# Patient Record
Sex: FEMALE | Race: WHITE | URBAN - METROPOLITAN AREA
[De-identification: names, ages, dates, MRNs, and addresses within clinical notes are randomized per-mention and may not be internally consistent; named-entity substitution may affect disease eponyms.]

---

## 2019-07-26 ENCOUNTER — HOSPITAL ENCOUNTER (OUTPATIENT)
Dept: PHYSICAL THERAPY | Age: 44
Discharge: HOME | End: 2019-07-26
Payer: COMMERCIAL

## 2019-07-26 PROCEDURE — 97162 PT EVAL MOD COMPLEX 30 MIN: CPT

## 2019-07-26 PROCEDURE — 97140 MANUAL THERAPY 1/> REGIONS: CPT

## 2019-07-26 PROCEDURE — 97110 THERAPEUTIC EXERCISES: CPT

## 2019-07-26 NOTE — PROGRESS NOTES
PELVIC FLOOR DAILY TREATMENT NOTE 8-14    Patient Name: Baron Parrish  Date:2019  : 1975  [x]  Patient  Verified  Payor: /    In time:2:30  Out time:4:00  Total Treatment Time (min): 90  Total Timed Codes (min): na  1:1 Treatment Time (min): na   Visit #: 1 of 8    Treatment Area: Pelvic floor dysfunction [M62.89]    SUBJECTIVE  Pain Level (0-10 scale): 5  Any medication changes, allergies to medications, adverse drug reactions, diagnosis change, or new procedure performed?: [x] No    [] Yes (see summary sheet for update)  Subjective functional status/changes:   [] No changes reported    See medical history    OBJECTIVE  Modality rationale: decrease inflammation, decrease pain and increase tissue extensibility to improve the patients ability to perform ADLs   Min Type Additional Details   na [] Biofeedback x na minutes    na    [] Estim: []Att   []Unatt        []TENS instruct                  []IFC  []Premod   []NMES                     []Other:  []w/US   []w/ice   []w/heat  Position:  Location:    []  Traction: [] Cervical       []Lumbar                       [] Prone          []Supine                       []Intermittent   []Continuous Lbs:  [] before manual  [] after manual    []  Ultrasound: []Continuous   [] Pulsed                           []1MHz   []3MHz Location:  W/cm2:    []  Iontophoresis with dexamethasone         Location: [] Take home patch   [] In clinic   10 [x]  Ice     []  heat  []  Ice massage Position: supine  Location: L/S and L pelvic    []  Vasopneumatic Device Pressure:       [] lo [] med [] hi   Temperature: [] lo [] med [] hi   [x] Skin assessment post-treatment:  []intact []redness- no adverse reaction       []redness  adverse reaction:       10 min Manual Therapy: MET L anterior thompson rotation with education on the SI joint. Rationale: Increase ROM to decrease dyspareunia.            15 min Patient Education: [x] Review HEP    [] Progressed/Changed HEP based on: Educated Pt in pelvic floor anatomy, function/dysfunction. Reviewed biofeedback results. Ice 10-15 minutes to L SI joint daily. [] positioning   [] body mechanics   [] transfers   [] heat/ice application          Pain Level (0-10 scale) post treatment: 0    ASSESSMENT/Changes in Function: Justification for Eval Code Complexity:  Patient History : See POC  Examination see exam   Clinical Presentation: evolving  Clinical Decision Making : FOTO : 25 /100      Patient will continue to benefit from skilled PT services to modify and progress therapeutic interventions, address strength deficits, analyze and address soft tissue restrictions, analyze and modify body mechanics/ergonomics, assess and modify postural abnormalities, instruct in home and community integration and address UI and dyspareunia. to attain remaining goals. [x]  See Plan of Care  []  See progress note/recertification  []  See Discharge Summary         Progress towards goals / Updated goals:  Initial evaluation completed with manual therapy and education initiated.       PLAN  [x]  Upgrade activities as tolerated     []  Continue plan of care  []  Update interventions per flow sheet       []  Discharge due to:_  []  Other:_      Andrew Beebe, PT 7/26/2019  2:29 PM      Future Appointments   Date Time Provider April Corbett   8/15/2019  5:00 PM Michelle Muir, PT [facility]

## 2019-07-26 NOTE — PROGRESS NOTES
[address] PHYSICAL THERAPY AT [facility] [address], [address]  [address] - Phone: [phone]  Fax: [phone] / [address] [address] PHYSICAL THERAPY SERVICES  Patient Name: Preethi Subramanian : 1975   Medical   Diagnosis: Pelvic floor dysfunction [M62.89] Treatment Diagnosis: Pelvic floor dysfunction [M62.89]   Onset Date: 2019     Referral Source: Simona Sanchez MD [facility]): 2019   Prior Hospitalization: See medical history Provider #: [id number]   Prior Level of Function: Chronic  LBP since . Comorbidities: G2, P2, Adnomyosis of uterus   Medications: Verified on Patient Summary List   The Plan of Care and following information is based on the information from the initial evaluation.   ==================================================================================  Assessment / key information:  Patient is a 53 y.o. yo female who presents to [facility] with diagnosis of Pelvic floor dysfunction [M62.89]. Patient reports L sided LBP which radiates down LLE to the heel, L pelvic pain, dyspareunia, JEAN with sneezing and coughing and urgency . Patient is G2, P2 with vaginal deliveries. LBP began insidiously in  but she was working as a  at that time lifting and carrying heavy trays. Dyspareunia began 6 months ago. Upon objective evaluation, patient demonstrates L SI hypomobility with anterior ileal rotation. There was tenderness to palpation over L pubococcygeus and obturator intenus muscles, R illiopsoas muscle. .  Strength of pelvic floor muscles was impaired at 1/5. On biofeedback patient presented with normal resting tone of pelvic floor at  1.54 microvolts. There was low net rise of fast twitch contraction at 2.69 microvolts and low net rise of slow twitch contraction at 2.5 microvolts. Patient scored 25 on FOTO/PFDI pain indicating decreased quality of life.    Patient can benefit from PT for retraining of muscle control and relaxation on biofeedback, manual therapy and core strengthening to decrease pain, Increase SI mobility, core/pelvic floor muscle strength  to improve quality of life.  ==================================================================================  Eval Complexity: History: HIGH Complexity :3+ comorbidities / personal factors will impact the outcome/ POC Exam:HIGH Complexity : 4+ Standardized tests and measures addressing body structure, function, activity limitation and / or participation in recreation  Presentation: MEDIUM Complexity : Evolving with changing characteristics  Clinical Decision Making:HIGH Complexity : FOTO score of 1- 25 Overall Complexity:MEDIUM  Problem List: Pelvic pain/dysfunction, Decreased pelvic floor mm awareness, Decreased pelvic floor mm strength, Urinary urgency and Other   Treatment Plan may include any combination of the following: Therapeutic exercise, Urge suppression techniques, Neuromuscular re-education, Manual therapy, Physical agent/modality and Patient education  Patient / Family readiness to learn indicated by: asking questions, trying to perform skills and interest  Persons(s) to be included in education: patient (P)  Barriers to Learning/Limitations: None  Measures taken:    Patient Goal (s): \"Not have a hysterectomy\"   Patient self reported health status: good  Rehabilitation Potential: good  Short Term Goals: To be accomplished in 4 weeks:     1) Patient performing pelvic floor exercises TID. 2) Patient will report 25% improvement in pain with ADLs. 3) Increase net rise of slow twitch contraction to 5 microvolts to increase pelvic support during ADLs. 4) Patient able to maintain SI mobility or self correct. Long Term Goals: To be accomplished in 8 weeks:   1) Patient independent in HEP. 2) Patient will report 25% improvement in pain with pain with sexual intercourse.      3) Decrease score on FOTO/PFDI pain to 20 to indicate improved quality of life. 4) Increase net rise of slow twitch contraction to 7 microvolts  to improve ability to perform household chores. Frequency / Duration:   Patient to be seen  1  times per week for 8  weeks:  Patient / Caregiver education and instruction: Pain Management and Exercises  G-Codes (GP): dipesh  Therapist Signature: Andrew Beebe PT Date: 4/10/4727   Certification Period: na Time: 4:05 PM   ==================================================================================  I certify that the above Physical Therapy Services are being furnished while the patient is under my care. I agree with the treatment plan and certify that this therapy is necessary. Physician Signature:        Date:       Time:     Please sign and [facility] to [facility] or you may fax the signed copy to [phone]. Thank you.

## 2019-08-15 ENCOUNTER — HOSPITAL ENCOUNTER (OUTPATIENT)
Dept: PHYSICAL THERAPY | Age: 44
Discharge: HOME | End: 2019-08-15
Payer: COMMERCIAL

## 2019-08-15 PROCEDURE — 97112 NEUROMUSCULAR REEDUCATION: CPT

## 2019-08-15 PROCEDURE — 97110 THERAPEUTIC EXERCISES: CPT

## 2019-08-15 PROCEDURE — 97140 MANUAL THERAPY 1/> REGIONS: CPT

## 2019-08-15 NOTE — PROGRESS NOTES
PELVIC FLOOR DAILY TREATMENT NOTE     Patient Name: Dena Goldstein  Date:8/15/2019  : 1975  [x]  Patient  Verified  Payor: Carissa Jacob / Plan: Amy Fong HMO / Product Type: HMO /    In time:4:50  Out time: 6:00  Total Treatment Time (min): 65  Total Timed Codes (min): na  1:1 Treatment Time (min): na   Visit #: 2 of 8    Treatment Area: Pelvic floor dysfunction [M62.89]    SUBJECTIVE  Pain Level (0-10 scale): 4  Any medication changes, allergies to medications, adverse drug reactions, diagnosis change, or new procedure performed?: [x] No    [] Yes (see summary sheet for update)  Subjective functional status/changes:   [] No changes reported    Patient reports doing ice most days. Less frequency of pain last week. OBJECTIVE  Modality rationale: decrease inflammation, decrease pain and increase tissue extensibility to improve the patients ability to perform ADLs. Neuromuscular reeducation to improve patients JEAN.    Min Type Additional Details   25 [x] Biofeedback x 25 minutes    supine surface    [] Estim: []Att   []Unatt        []TENS instruct                  []IFC  []Premod   []NMES                     []Other:  []w/US   []w/ice   []w/heat  Position:  Location:    []  Traction: [] Cervical       []Lumbar                       [] Prone          []Supine                       []Intermittent   []Continuous Lbs:  [] before manual  [] after manual    []  Ultrasound: []Continuous   [] Pulsed                           []1MHz   []3MHz Location:  W/cm2:    []  Iontophoresis with dexamethasone         Location: [] Take home patch   [] In clinic   10 [x]  Ice     []  heat  []  Ice massage Position: supine  Location: L/S and bilateral pelvic    []  Vasopneumatic Device Pressure:       [] lo [] med [] hi   Temperature: [] lo [] med [] hi   [x] Skin assessment post-treatment:  []intact []redness- no adverse reaction       []redness  adverse reaction:     15 min Therapeutic Exercise:  [x] See flow sheet :  []  Pelvic floor strengthening                []  Pelvic floor downtraining  []  Quality pelvic floor contractions      []  Relaxation techniques  []  Urge suppression exercises  [x]  Other:  Psoas inhibition exercises    Rationale: Decrease muscle activity to improve the patients ability to perform ADLs      15 min Manual Therapy: Release to bilateral illiopsoas near illiac crests and bilateral obturator internus muscles vaginally with vaginal stretching. Rationale: Increase ROM to decrease dyspareunia.            min Patient Education: [x] Review HEP    [] Progressed/Changed HEP based on: Begin psoas inhibition exercises. Continue ice. [] positioning   [] body mechanics   [] transfers   [] heat/ice application        Other Objective/Functional Measures:  - standing flexion and gillet                   []baseline resting tone: na              [x]slow twitch mms 8.64(4.63)              [x]fast twitch mms8.46(3.19)  Pain Level (0-10 scale) post treatment: 0    ASSESSMENT/Changes in Function: Patient maintaining SI mobility. Continued illiopsoas tenderness. Started on psoas inhibition exercises. Patient will continue to benefit from skilled PT services to modify and progress therapeutic interventions, address strength deficits, analyze and address soft tissue restrictions, analyze and modify body mechanics/ergonomics, assess and modify postural abnormalities, instruct in home and community integration and address UI and dyspareunia. to attain remaining goals. []  See Plan of Care  []  See progress note/recertification  []  See Discharge Summary         Progress towards goals / Updated goals:                 1) Patient performing pelvic floor exercises 3x day                   2) Patient will report 25% improvement in pain with ADLs. 3) Increase net rise of slow twitch contraction to 5 microvolts to increase pelvic support during ADLs.                  4) Patient able to maintain SI mobility or self correct. Met  PLAN  [x]  Upgrade activities as tolerated     []  Continue plan of care  []  Update interventions per flow sheet       []  Discharge due to:_  []  Other:_      Abhilash Del Valle, PT 8/15/2019  6:00 PM      No future appointments.

## 2019-08-15 NOTE — PROGRESS NOTES
[address] PHYSICAL THERAPY AT [facility] [address], [address] - Phone: [phone]  Fax: [phone] / [address] [address] PHYSICAL THERAPY SERVICES  Patient Name: Margarita Mcguire : 1975   Medical   Diagnosis: Pelvic floor dysfunction [M62.89] Treatment Diagnosis: Pelvic floor dysfunction [M62.89]   Onset Date: 2019     Referral Source: Nhan Jones MD [facility]): 8/15/2019   Prior Hospitalization: See medical history Provider #: [id number]   Prior Level of Function: Chronic  LBP since . Comorbidities: G2, P2, Adnomyosis of uterus   Medications: Verified on Patient Summary List   The Plan of Care and following information is based on the information from the initial evaluation.   ==================================================================================  Assessment / key information:  Patient is a 53 y.o. yo female who presents to [facility] with diagnosis of Pelvic floor dysfunction [M62.89]. Patient reports L sided LBP which radiates down LLE to the heel, L pelvic pain, dyspareunia, JEAN with sneezing and coughing and urgency . Patient is G2, P2 with vaginal deliveries. LBP began insidiously in  but she was working as a  at that time lifting and carrying heavy trays. Dyspareunia began 6 months ago. Upon objective evaluation, patient demonstrates L SI hypomobility with anterior ileal rotation. There was tenderness to palpation over L pubococcygeus and obturator intenus muscles, R illiopsoas muscle. .  Strength of pelvic floor muscles was impaired at 1/5. On biofeedback patient presented with normal resting tone of pelvic floor at  1.54 microvolts. There was low net rise of fast twitch contraction at 2.69 microvolts and low net rise of slow twitch contraction at 2.5 microvolts. Patient scored 25 on FOTO/PFDI pain indicating decreased quality of life.    Patient can benefit from PT for retraining of muscle control and relaxation on biofeedback, manual therapy and core strengthening to decrease pain, Increase SI mobility, core/pelvic floor muscle strength  to improve quality of life.  ==================================================================================  Eval Complexity: History: HIGH Complexity :3+ comorbidities / personal factors will impact the outcome/ POC Exam:HIGH Complexity : 4+ Standardized tests and measures addressing body structure, function, activity limitation and / or participation in recreation  Presentation: MEDIUM Complexity : Evolving with changing characteristics  Clinical Decision Making:HIGH Complexity : FOTO score of 1- 25 Overall Complexity:MEDIUM  Problem List: Pelvic pain/dysfunction, Decreased pelvic floor mm awareness, Decreased pelvic floor mm strength, Urinary urgency and Other   Treatment Plan may include any combination of the following: Therapeutic exercise, Urge suppression techniques, Neuromuscular re-education, Manual therapy, Physical agent/modality and Patient education  Patient / Family readiness to learn indicated by: asking questions, trying to perform skills and interest  Persons(s) to be included in education: patient (P)  Barriers to Learning/Limitations: None  Measures taken:    Patient Goal (s): \"Not have a hysterectomy\"   Patient self reported health status: good  Rehabilitation Potential: good  Short Term Goals: To be accomplished in 4 weeks:     1) Patient performing pelvic floor exercises TID. 2) Patient will report 25% improvement in pain with ADLs. 3) Increase net rise of slow twitch contraction to 5 microvolts to increase pelvic support during ADLs. 4) Patient able to maintain SI mobility or self correct. Long Term Goals: To be accomplished in 8 weeks:   1) Patient independent in HEP. 2) Patient will report 25% improvement in pain with pain with sexual intercourse.      3) Decrease score on FOTO/PFDI pain to 20 to indicate improved quality of life. 4) Increase net rise of slow twitch contraction to 7 microvolts  to improve ability to perform household chores. Frequency / Duration:   Patient to be seen  1  times per week for 8  weeks:  Patient / Caregiver education and instruction: Pain Management and Exercises  G-Codes (GP): dipesh  Therapist Signature: Shiva Schreiber PT Date: 5/29/0782   Certification Period: na Time: 4:05 PM   ==================================================================================  I certify that the above Physical Therapy Services are being furnished while the patient is under my care. I agree with the treatment plan and certify that this therapy is necessary. Physician Signature:        Date:       Time:     [facility] or you may fax the signed copy to [phone]. Thank you.

## 2019-08-22 ENCOUNTER — HOSPITAL ENCOUNTER (OUTPATIENT)
Dept: PHYSICAL THERAPY | Age: 44
Discharge: HOME | End: 2019-08-22
Payer: COMMERCIAL

## 2019-08-22 PROCEDURE — 97110 THERAPEUTIC EXERCISES: CPT

## 2019-08-22 PROCEDURE — 97140 MANUAL THERAPY 1/> REGIONS: CPT

## 2019-08-22 PROCEDURE — 97112 NEUROMUSCULAR REEDUCATION: CPT

## 2019-08-22 NOTE — PROGRESS NOTES
PELVIC FLOOR DAILY TREATMENT NOTE 8-    Patient Name: Pb Gallegos  Date:2019  : 1975  [x]  Patient  Verified  Payor: Ryann Garibay / Plan: Chip Acosta HMO / Product Type: HMO /    In time: 11:42 Out time: 12:32  Total Treatment Time (min): 50  Total Timed Codes (min): na  1:1 Treatment Time (min): na   Visit #: 3 of 8    Treatment Area: Pelvic floor dysfunction [M62.89]    SUBJECTIVE  Pain Level (0-10 scale): 5  Any medication changes, allergies to medications, adverse drug reactions, diagnosis change, or new procedure performed?: [x] No    [] Yes (see summary sheet for update)  Subjective functional status/changes:   [] No changes reported    Patient reports doing HEP 1x day for 5 days. Was on vacation and drove 5 hours there and back. OBJECTIVE  Modality rationale: decrease inflammation, decrease pain and increase tissue extensibility to improve the patients ability to perform ADLs. Neuromuscular reeducation to improve patients JEAN.    Min Type Additional Details   17 [x] Biofeedback x 17 minutes    supine surface    [] Estim: []Att   []Unatt        []TENS instruct                  []IFC  []Premod   []NMES                     []Other:  []w/US   []w/ice   []w/heat  Position:  Location:    []  Traction: [] Cervical       []Lumbar                       [] Prone          []Supine                       []Intermittent   []Continuous Lbs:  [] before manual  [] after manual    []  Ultrasound: []Continuous   [] Pulsed                           []1MHz   []3MHz Location:  W/cm2:    []  Iontophoresis with dexamethasone         Location: [] Take home patch   [] In clinic   PD TC [x]  Ice     []  heat  []  Ice massage Position: supine  Location: L/S and bilateral pelvic    []  Vasopneumatic Device Pressure:       [] lo [] med [] hi   Temperature: [] lo [] med [] hi   [x] Skin assessment post-treatment:  []intact []redness- no adverse reaction       []redness  adverse reaction:     10 min Therapeutic Exercise:  [x] See flow sheet :  []  Pelvic floor strengthening                []  Pelvic floor downtraining  []  Quality pelvic floor contractions      []  Relaxation techniques  []  Urge suppression exercises  [x]  Other:  Psoas inhibition exercises    Rationale: Decrease muscle activity to improve the patients ability to perform ADLs      23 min Manual Therapy: MET L anterior ileal rotation with instruction in self correction. Release to bilateral illiopsoas near illiac crests. Education in correct execution of pelvic floor contraction with manual palpation. Rationale: Increase ROM to decrease dyspareunia.            min Patient Education: [x] Review HEP    [] Progressed/Changed HEP based on: Add fast twitch pelvic floor contractions. SI correction as needed. [] positioning   [] body mechanics   [] transfers   [] heat/ice application        Other Objective/Functional Measures: + standing flexion on left.                  []baseline resting tone: na              [x]slow twitch mms 5.97(3.47)              [x]fast twitch mms 6.54(3.15)  Pain Level (0-10 scale) post treatment: 0    ASSESSMENT/Changes in Function: Continued SI hypomobility. Patient instructed in self mobilization. Patient will continue to benefit from skilled PT services to modify and progress therapeutic interventions, address strength deficits, analyze and address soft tissue restrictions, analyze and modify body mechanics/ergonomics, assess and modify postural abnormalities, instruct in home and community integration and address UI and dyspareunia. to attain remaining goals. []  See Plan of Care  []  See progress note/recertification  []  See Discharge Summary         Progress towards goals / Updated goals:                 1) Patient performing pelvic floor exercises 3x day                   2) Patient will report 25% improvement in pain with ADLs.                    3) Increase net rise of slow twitch contraction to 5 microvolts to increase pelvic support during ADLs. 4) Patient able to maintain SI mobility or self correct.   Met  PLAN  [x]  Upgrade activities as tolerated     []  Continue plan of care  []  Update interventions per flow sheet       []  Discharge due to:_  []  Other:_      Lalit Sanders, PT 8/22/2019  12:32 PM      Future Appointments   Date Time Provider April Corbett   9/11/2019  7:30 AM Merlin Espinosa, PT [facility]   9/27/2019 12:45 PM Merlin Espinosa PT [facility]   10/4/2019 12:45 PM Merlin Espinosa PT [facility]   10/11/2019 12:45 PM Merlin Espinosa PT [facility]   10/18/2019 12:45 PM Merlin Espinosa, PT [facility]

## 2019-09-11 ENCOUNTER — HOSPITAL ENCOUNTER (OUTPATIENT)
Dept: PHYSICAL THERAPY | Age: 44
Discharge: HOME | End: 2019-09-11
Payer: COMMERCIAL

## 2019-09-11 PROCEDURE — 97112 NEUROMUSCULAR REEDUCATION: CPT

## 2019-09-11 PROCEDURE — 97140 MANUAL THERAPY 1/> REGIONS: CPT

## 2019-09-11 NOTE — PROGRESS NOTES
[facility] PHYSICAL THERAPY AT [address], [address], [place] [address] - Phone: [phone]  Fax: [phone]  PROGRESS NOTE  Patient Name: Tiffany Martin : 1975   Treatment/Medical Diagnosis: Pelvic floor dysfunction [M62.89]   Referral Source: Jonathan Pineda MD     Date of Initial Visit: 2019 Attended Visits: 4 Missed Visits: 0   SUMMARY OF TREATMENT  PT has consisted of pelvic floor relaxation/strengthening via biofeedback, education as to pelvic floor anatomy and function , manual therapy, ice and home exercise program.   CURRENT STATUS  Patient has made slow progress in PT with short term goals either met or progressing. Functional progress Includes patient reporting 60% improvement in L sided LBP and improved JEAN. Patient demonstrates improved but still impaired pelvic floor muscle strength and right sided LBP. Goal/Measure of Progress Goal Met? 1. Patient performing pelvic floor exercises 3x day   Status at last Eval: na Current Status: 2-3x day progressing   2. Patient will report 25% improvement in pain with ADLs. Status at last Eval: na Current Status: 60% yes   3. Increase net rise of slow twitch contraction to 5 microvolts to increase pelvic support during ADLs. 4) Patient able to maintain SI mobility or self correct. Status at last Eval: 2.5  na Current Status: 11.2 with add  Pt able to self correct Yes  yes   New Goals to be achieved in __4__  weeks:                 1) Patient independent in HEP. 2) Patient will report 25% improvement in pain with pain with sexual intercourse. 3) Decrease score on FOTO/PFDI pain to 20 to indicate improved quality of life. 4) Increase net rise of slow twitch contraction to 13 microvolts  to improve ability to perform                 RECOMMENDATIONS  Continue pelvic floor PT 1x week for 4 weeks.    If you have any questions/comments please contact us directly at [phone]. Thank you for allowing us to assist in the care of your patient. Therapist Signature: Karyn Lee PT Date: 9/11/2019     Time: 7:34 AM   NOTE TO PHYSICIAN:  PLEASE COMPLETE THE ORDERS BELOW AND FAX TO   [facility]: [phone]. If you are unable to process this request in 24 hours please contact our office: [phone].    ___ I have read the above report and request that my patient continue as recommended.   ___ I have read the above report and request that my patient continue therapy with the following changes/special instructions:_________________________________________________________   ___ I have read the above report and request that my patient be discharged from therapy.      Physician Signature:        Date:       Time:

## 2019-09-11 NOTE — PROGRESS NOTES
PELVIC FLOOR DAILY TREATMENT NOTE 8-14    Patient Name: Richie Apodaca  Date:2019  : 1975  [x]  Patient  Verified  Payor: Jose Garsia / Plan: [employer] HMO / Product Type: HMO /    In time: 7:40 Out time: 8:16  Total Treatment Time (min): 36  Total Timed Codes (min): na  1:1 Treatment Time (min): na   Visit #: 4 of 8    Treatment Area: Pelvic floor dysfunction [M62.89]    SUBJECTIVE  Pain Level (0-10 scale): 7 right LBP  Any medication changes, allergies to medications, adverse drug reactions, diagnosis change, or new procedure performed?: [x] No    [] Yes (see summary sheet for update)  Subjective functional status/changes:   [] No changes reported    Patient reports doing PF exercises 2-3x day. No left sided LBP overall 60% improved but now has right sided LBP. Leaking is better. OBJECTIVE  Modality rationale: decrease inflammation, decrease pain and increase tissue extensibility to improve the patients ability to perform ADLs. Neuromuscular reeducation to improve patients JEAN.    Min Type Additional Details   26 [x] Biofeedback x 26 minutes    supine surface    [] Estim: []Att   []Unatt        []TENS instruct                  []IFC  []Premod   []NMES                     []Other:  []w/US   []w/ice   []w/heat  Position:  Location:    []  Traction: [] Cervical       []Lumbar                       [] Prone          []Supine                       []Intermittent   []Continuous Lbs:  [] before manual  [] after manual    []  Ultrasound: []Continuous   [] Pulsed                           []1MHz   []3MHz Location:  W/cm2:    []  Iontophoresis with dexamethasone         Location: [] Take home patch   [] In clinic   na [x]  Ice     []  heat  []  Ice massage Position: supine  Location: L/S and bilateral pelvic    []  Vasopneumatic Device Pressure:       [] lo [] med [] hi   Temperature: [] lo [] med [] hi   [x] Skin assessment post-treatment:  []intact []redness- no adverse reaction       []redness  adverse reaction:     na min Therapeutic Exercise:  [x] See flow sheet :  []  Pelvic floor strengthening                []  Pelvic floor downtraining  []  Quality pelvic floor contractions      []  Relaxation techniques  []  Urge suppression exercises  [x]  Other:  Psoas inhibition exercises    Rationale: Decrease muscle activity to improve the patients ability to perform ADLs      10 min Manual Therapy: MET right anterior ileal rotation with education in self correction. PPT   Rationale: Increase tissue extensibility to improve patient's ability to engage in sexual intercourse and undergo a gynecological exam.              min Patient Education: [x] Review HEP    [] Progressed/Changed HEP based on: Add accessory muscle use. [] positioning   [] body mechanics   [] transfers   [] heat/ice application        Other Objective/Functional Measures:                  []baseline resting tone: na              [x]slow twitch mms 15.4(11.2) with add              [x]fast twitch mms 10.6(5.72) with add  Pain Level (0-10 scale) post treatment: 5    ASSESSMENT/Changes in Function: See PN    Patient will continue to benefit from skilled PT services to modify and progress therapeutic interventions, address strength deficits, analyze and address soft tissue restrictions, analyze and modify body mechanics/ergonomics, assess and modify postural abnormalities, instruct in home and community integration and address UI and dyspareunia. to attain remaining goals.      []  See Plan of Care  [x]  See progress note/recertification  []  See Discharge Summary         Progress towards goals / Updated goals:                 See PN  PLAN  [x]  Upgrade activities as tolerated     []  Continue plan of care  []  Update interventions per flow sheet       []  Discharge due to:_  []  Other:_      Marco Antonio Holguin PT 9/11/2019  8:16 AM      Future Appointments   Date Time Provider April Corbett   9/27/2019 12:45 PM Crystal Arzola, PT [facility] 10/4/2019 12:45 PM Peter Rivera, PT [facility]   10/11/2019 12:45 PM Peter Rivera, PT [facility]   10/18/2019 12:45 PM Peter Rivera, PT [facility]

## 2019-09-27 ENCOUNTER — HOSPITAL ENCOUNTER (OUTPATIENT)
Dept: PHYSICAL THERAPY | Age: 44
Discharge: HOME | End: 2019-09-27
Payer: COMMERCIAL

## 2019-09-27 PROCEDURE — 97112 NEUROMUSCULAR REEDUCATION: CPT

## 2019-09-27 PROCEDURE — 97110 THERAPEUTIC EXERCISES: CPT

## 2019-09-27 NOTE — PROGRESS NOTES
PELVIC FLOOR DAILY TREATMENT NOTE 8-14    Patient Name: Tammy Reeves  Date:2019  : 1975  [x]  Patient  Verified  Payor: Hugh Herman / Plan: [employer] HMO / Product Type: HMO /    In time: 12:56 Out time: 1:35  Total Treatment Time (min): 39  Total Timed Codes (min): na  1:1 Treatment Time (min): na   Visit #: 5 of 8    Treatment Area: Pelvic floor dysfunction [M62.89]    SUBJECTIVE  Pain Level (0-10 scale): 0  Any medication changes, allergies to medications, adverse drug reactions, diagnosis change, or new procedure performed?: [x] No    [] Yes (see summary sheet for update)  Subjective functional status/changes:   [] No changes reported    Patient reports that she has not been having back pain. Tried heat and its really helping. Still gets urgency especially just before going to bed. .  OBJECTIVE  Modality rationale: decrease inflammation, decrease pain and increase tissue extensibility to improve the patients ability to perform ADLs. Neuromuscular reeducation to improve patients JEAN.    Min Type Additional Details   15 [x] Biofeedback x 15 minutes    sit surface    [] Estim: []Att   []Unatt        []TENS instruct                  []IFC  []Premod   []NMES                     []Other:  []w/US   []w/ice   []w/heat  Position:  Location:    []  Traction: [] Cervical       []Lumbar                       [] Prone          []Supine                       []Intermittent   []Continuous Lbs:  [] before manual  [] after manual    []  Ultrasound: []Continuous   [] Pulsed                           []1MHz   []3MHz Location:  W/cm2:    []  Iontophoresis with dexamethasone         Location: [] Take home patch   [] In clinic   na [x]  Ice     []  heat  []  Ice massage Position: supine  Location: L/S and bilateral pelvic    []  Vasopneumatic Device Pressure:       [] lo [] med [] hi   Temperature: [] lo [] med [] hi   [x] Skin assessment post-treatment:  []intact []redness- no adverse reaction       []redness  adverse reaction:     24 min Therapeutic Exercise:  [x] See flow sheet :  []  Pelvic floor strengthening                []  Pelvic floor downtraining  []  Quality pelvic floor contractions      []  Relaxation techniques  [x]  Urge suppression exercises  []  Other:    Rationale: Decrease urgency. na min Manual Therapy:                  min Patient Education: [x] Review HEP    [] Progressed/Changed HEP based on: Add urge suppression. PF exercises without accessory muscle use. [] positioning   [] body mechanics   [] transfers   [] heat/ice application        Other Objective/Functional Measures:                  []baseline resting tone: na              [x]slow twitch mms 17.9(11.9) sit without accessory muscle              [x]fast twitch mms 22.3(13.8)  Pain Level (0-10 scale) post treatment: 0    ASSESSMENT/Changes in Function:  Patient demonstrates improved strength of pelvic floor muscles without accessory muscle use. Patient will continue to benefit from skilled PT services to modify and progress therapeutic interventions, address strength deficits, analyze and address soft tissue restrictions, analyze and modify body mechanics/ergonomics, assess and modify postural abnormalities, instruct in home and community integration and address UI and dyspareunia. to attain remaining goals. []  See Plan of Care  []  See progress note/recertification  []  See Discharge Summary         Progress towards goals / Updated goals:                 1) Patient independent in HEP.                   2) Patient will report 25% improvement in pain with pain with sexual intercourse.                   3) Decrease score on FOTO/PFDI pain to 20 to indicate improved quality of life.                   4) Increase net rise of slow twitch contraction to 13 microvolts  to improve ability to perform.   Progressing                  PLAN  [x]  Upgrade activities as tolerated     []  Continue plan of care  []  Update interventions per flow sheet       []  Discharge due to:_  []  Other:_      Alyse Engel, PT 9/27/2019  1:35 PM      Future Appointments   Date Time Provider April Corbett   10/4/2019 12:45 PM Ulises Camejo, PT [facility]   10/11/2019 12:45 PM Ulises Camejo, PT [facility]   10/18/2019 12:45 PM Ulises Camejo, PT [facility]

## 2019-10-04 ENCOUNTER — HOSPITAL ENCOUNTER (OUTPATIENT)
Dept: PHYSICAL THERAPY | Age: 44
Discharge: HOME | End: 2019-10-04
Payer: COMMERCIAL

## 2019-10-04 PROCEDURE — 97112 NEUROMUSCULAR REEDUCATION: CPT

## 2019-10-04 PROCEDURE — 97140 MANUAL THERAPY 1/> REGIONS: CPT

## 2019-10-04 NOTE — PROGRESS NOTES
PELVIC FLOOR DAILY TREATMENT NOTE 8-14    Patient Name: Rajni Enrique  Date:10/4/2019  : 1975  [x]  Patient  Verified  Payor: Michelle Gr / Plan: Leslie [employer]q HMO / Product Type: HMO /    In time: 12:57 Out time: 1:48  Total Treatment Time (min): 52  Total Timed Codes (min): na  1:1 Treatment Time (min): na   Visit #: 6 of 8    Treatment Area: Pelvic floor dysfunction [M62.89]    SUBJECTIVE  Pain Level (0-10 scale): 2  Any medication changes, allergies to medications, adverse drug reactions, diagnosis change, or new procedure performed?: [x] No    [] Yes (see summary sheet for update)  Subjective functional status/changes:   [] No changes reported  Patient reports that she woke up with a little pain this AM.. OBJECTIVE  Modality rationale: decrease inflammation, decrease pain and increase tissue extensibility to improve the patients ability to perform ADLs. Neuromuscular reeducation to improve patients JEAN.    Min Type Additional Details   42 [x] Biofeedback x 42 minutes    sit and stand surface    [] Estim: []Att   []Unatt        []TENS instruct                  []IFC  []Premod   []NMES                     []Other:  []w/US   []w/ice   []w/heat  Position:  Location:    []  Traction: [] Cervical       []Lumbar                       [] Prone          []Supine                       []Intermittent   []Continuous Lbs:  [] before manual  [] after manual    []  Ultrasound: []Continuous   [] Pulsed                           []1MHz   []3MHz Location:  W/cm2:    []  Iontophoresis with dexamethasone         Location: [] Take home patch   [] In clinic   na [x]  Ice     []  heat  []  Ice massage Position: supine  Location: L/S and bilateral pelvic    []  Vasopneumatic Device Pressure:       [] lo [] med [] hi   Temperature: [] lo [] med [] hi   [x] Skin assessment post-treatment:  []intact []redness- no adverse reaction       []redness  adverse reaction:     na min Therapeutic Exercise:  [x] See flow sheet :  [] Pelvic floor strengthening                []  Pelvic floor downtraining  []  Quality pelvic floor contractions      []  Relaxation techniques  []  Urge suppression exercises  []  Other:    Rationale: Decrease urgency. 10 min Manual Therapy: Release/DTM right piriformis and gluteus medius muscles. Rationale: Increase tissue extensibility to decrease pain with ADLs               min Patient Education: [x] Review HEP    [] Progressed/Changed HEP based on: Same HEP  [] positioning   [] body mechanics   [] transfers   [] heat/ice application        Other Objective/Functional Measures:                  []baseline resting tone: na              [x]slow twitch mms 23.3(18) seated              [x]fast twitch mms 22.9(13.3)  Pain Level (0-10 scale) post treatment: 0    ASSESSMENT/Changes in Function:  Patient demonstrates improved strength of slow twitch pelvic floor muscles. Patient will continue to benefit from skilled PT services to modify and progress therapeutic interventions, address strength deficits, analyze and address soft tissue restrictions, analyze and modify body mechanics/ergonomics, assess and modify postural abnormalities, instruct in home and community integration and address UI and dyspareunia. to attain remaining goals. []  See Plan of Care  []  See progress note/recertification  []  See Discharge Summary         Progress towards goals / Updated goals:                 1) Patient independent in HEP. Progressing                   2) Patient will report 25% improvement in pain with pain with sexual intercourse.                   3) Decrease score on FOTO/PFDI pain to 20 to indicate improved quality of life.                   4) Increase net rise of slow twitch contraction to 13 microvolts  to improve ability to perform.   Met                  PLAN  [x]  Upgrade activities as tolerated     []  Continue plan of care  []  Update interventions per flow sheet       []  Discharge due to:_  [] Other:_      Alyse Engel, PT 10/4/2019  1:48 PM      Future Appointments   Date Time Provider April Corbett   10/11/2019 12:45 PM [facility]   10/18/2019 12:45 PM Ulises C[facility], [facility]

## 2019-10-11 ENCOUNTER — APPOINTMENT (OUTPATIENT)
Dept: PHYSICAL THERAPY | Age: 44
End: 2019-10-11
Payer: COMMERCIAL

## 2019-10-18 ENCOUNTER — APPOINTMENT (OUTPATIENT)
Dept: PHYSICAL THERAPY | Age: 44
End: 2019-10-18
Payer: COMMERCIAL

## 2019-11-12 NOTE — PROGRESS NOTES
[address] [place] [address]  Phone: [phone]  Fax: [phone] SUMMARY  Patient Name: Thien Kern : 1975   Treatment/Medical Diagnosis: Pelvic floor dysfunction [M62.89]   Referral Source: Suyapa Cortes MD     Date of Initial Visit: 2019 Attended Visits: 14 Missed Visits: 1     SUMMARY OF TREATMENT  PT has consisted of pelvic floor relaxation/strengthening via biofeedback, education as to pelvic floor anatomy and function manual therapy, ice and home exercise program.                 CURRENT STATUS  Patient completed 6 of 8 treatments then did not return to PT. Unable to reassess goals as stated below.                  1) Patient independent in HEP.                   2) Patient will report 25% improvement in pain with pain with sexual intercourse.                   3) Decrease score on FOTO/PFDI pain to 20 to indicate improved quality of life.                   4) Increase net rise of slow twitch contraction to 13 microvolts  to improve ability to perform     RECOMMENDATIONS  Discontinue therapy due to lack of attendance or compliance. .  Patient to continue on home exercise program.  If you have any questions/comments please contact us directly at [phone]. Thank you for allowing us to assist in the care of your patient. Therapist Signature:  Migdalia Calvert PT Date: 10/4/2019     Time: 8:36 AM